# Patient Record
Sex: FEMALE | Race: BLACK OR AFRICAN AMERICAN
[De-identification: names, ages, dates, MRNs, and addresses within clinical notes are randomized per-mention and may not be internally consistent; named-entity substitution may affect disease eponyms.]

---

## 2018-01-12 ENCOUNTER — HOSPITAL ENCOUNTER (EMERGENCY)
Dept: HOSPITAL 62 - ER | Age: 58
Discharge: HOME | End: 2018-01-12
Payer: COMMERCIAL

## 2018-01-12 VITALS — SYSTOLIC BLOOD PRESSURE: 142 MMHG | DIASTOLIC BLOOD PRESSURE: 86 MMHG

## 2018-01-12 DIAGNOSIS — R42: Primary | ICD-10-CM

## 2018-01-12 DIAGNOSIS — Z79.899: ICD-10-CM

## 2018-01-12 DIAGNOSIS — I10: ICD-10-CM

## 2018-01-12 LAB
ADD MANUAL DIFF: NO
ALBUMIN SERPL-MCNC: 4.9 G/DL (ref 3.5–5)
ALP SERPL-CCNC: 115 U/L (ref 38–126)
ALT SERPL-CCNC: 39 U/L (ref 9–52)
ANION GAP SERPL CALC-SCNC: 12 MMOL/L (ref 5–19)
APPEARANCE UR: CLEAR
APTT PPP: (no result) S
AST SERPL-CCNC: 38 U/L (ref 14–36)
BASOPHILS # BLD AUTO: 0.1 10^3/UL (ref 0–0.2)
BASOPHILS NFR BLD AUTO: 1 % (ref 0–2)
BILIRUB DIRECT SERPL-MCNC: 0.2 MG/DL (ref 0–0.4)
BILIRUB SERPL-MCNC: 0.7 MG/DL (ref 0.2–1.3)
BILIRUB UR QL STRIP: NEGATIVE
BUN SERPL-MCNC: 15 MG/DL (ref 7–20)
CALCIUM: 10.3 MG/DL (ref 8.4–10.2)
CHLORIDE SERPL-SCNC: 103 MMOL/L (ref 98–107)
CO2 SERPL-SCNC: 29 MMOL/L (ref 22–30)
EOSINOPHIL # BLD AUTO: 0.1 10^3/UL (ref 0–0.6)
EOSINOPHIL NFR BLD AUTO: 1.6 % (ref 0–6)
ERYTHROCYTE [DISTWIDTH] IN BLOOD BY AUTOMATED COUNT: 14.1 % (ref 11.5–14)
GLUCOSE SERPL-MCNC: 96 MG/DL (ref 75–110)
GLUCOSE UR STRIP-MCNC: NEGATIVE MG/DL
HCT VFR BLD CALC: 40.5 % (ref 36–47)
HGB BLD-MCNC: 13.6 G/DL (ref 12–15.5)
KETONES UR STRIP-MCNC: NEGATIVE MG/DL
LYMPHOCYTES # BLD AUTO: 3 10^3/UL (ref 0.5–4.7)
LYMPHOCYTES NFR BLD AUTO: 40.2 % (ref 13–45)
MAGNESIUM SERPL-MCNC: 2 MG/DL (ref 1.6–2.3)
MCH RBC QN AUTO: 29.8 PG (ref 27–33.4)
MCHC RBC AUTO-ENTMCNC: 33.5 G/DL (ref 32–36)
MCV RBC AUTO: 89 FL (ref 80–97)
MONOCYTES # BLD AUTO: 0.5 10^3/UL (ref 0.1–1.4)
MONOCYTES NFR BLD AUTO: 7 % (ref 3–13)
NEUTROPHILS # BLD AUTO: 3.7 10^3/UL (ref 1.7–8.2)
NEUTS SEG NFR BLD AUTO: 50.2 % (ref 42–78)
NITRITE UR QL STRIP: NEGATIVE
PH UR STRIP: 7 [PH] (ref 5–9)
PHOSPHATE SERPL-MCNC: 3.6 MG/DL (ref 2.5–4.5)
PLATELET # BLD: 284 10^3/UL (ref 150–450)
POTASSIUM SERPL-SCNC: 3.9 MMOL/L (ref 3.6–5)
PROT SERPL-MCNC: 8.4 G/DL (ref 6.3–8.2)
PROT UR STRIP-MCNC: NEGATIVE MG/DL
RBC # BLD AUTO: 4.55 10^6/UL (ref 3.72–5.28)
SODIUM SERPL-SCNC: 144.4 MMOL/L (ref 137–145)
SP GR UR STRIP: 1
TOTAL CELLS COUNTED % (AUTO): 100 %
UROBILINOGEN UR-MCNC: NEGATIVE MG/DL (ref ?–2)
WBC # BLD AUTO: 7.3 10^3/UL (ref 4–10.5)

## 2018-01-12 PROCEDURE — 87086 URINE CULTURE/COLONY COUNT: CPT

## 2018-01-12 PROCEDURE — 93010 ELECTROCARDIOGRAM REPORT: CPT

## 2018-01-12 PROCEDURE — 84484 ASSAY OF TROPONIN QUANT: CPT

## 2018-01-12 PROCEDURE — 80076 HEPATIC FUNCTION PANEL: CPT

## 2018-01-12 PROCEDURE — 99284 EMERGENCY DEPT VISIT MOD MDM: CPT

## 2018-01-12 PROCEDURE — 36415 COLL VENOUS BLD VENIPUNCTURE: CPT

## 2018-01-12 PROCEDURE — 83735 ASSAY OF MAGNESIUM: CPT

## 2018-01-12 PROCEDURE — 85025 COMPLETE CBC W/AUTO DIFF WBC: CPT

## 2018-01-12 PROCEDURE — 84100 ASSAY OF PHOSPHORUS: CPT

## 2018-01-12 PROCEDURE — 93005 ELECTROCARDIOGRAM TRACING: CPT

## 2018-01-12 PROCEDURE — 80048 BASIC METABOLIC PNL TOTAL CA: CPT

## 2018-01-12 PROCEDURE — 81001 URINALYSIS AUTO W/SCOPE: CPT

## 2018-01-12 NOTE — EKG REPORT
SEVERITY:- ABNORMAL ECG -

SINUS RHYTHM

PROBABLE LEFT ATRIAL ABNORMALITY

LVH WITH SECONDARY REPOLARIZATION ABNORMALITY

ANTERIOR Q WAVES, POSSIBLY DUE TO LVH

PROLONGED QT INTERVAL

:

Confirmed by: King Lew MD 12-Jan-2018 13:58:27

## 2018-01-12 NOTE — ER DOCUMENT REPORT
ED General





- General


Chief Complaint: Dizziness


Stated Complaint: DIZZINESS,WEAKNESS


Time Seen by Provider: 18 09:27


Notes: 





She states that she had a upper respiratory infection.  Was on some hydrocodone 

cough medication as well as a Z-Gary.  Does not have any more sore throat or head

/face issues but just feels wiped out.  Does not know if maybe she got 

dehydrated.  Also noticed that her blood pressure is high.  Denies any chest 

pain, shortness of breath, abdominal pain, excessive nausea, vomiting or 

diarrhea, no neck stiffness.  No other major issues at this time.


TRAVEL OUTSIDE OF THE [place] IN LAST 30 DAYS: No





- HPI


Onset/Duration: Gradual


Severity: Mild


Pain Level: 0


Associated symptoms: Weakness


Exacerbated by: Denies


Relieved by: Denies





- Related Data


Allergies/Adverse Reactions: 


 





No Known Allergies Allergy (Verified 18 08:57)


 








Home Medications: 


 Current Home Medications





Azilsartan Med/Chlorthalidone [Edarbyclor 40-12.5 mg Tablet] 40 mg PO DAILY  [History]


Azithromycin 250 mg PO DAILY 18 [History]











Past Medical History





- General


Information source: Patient





- Social History


Smoking Status: Never Smoker


Chew tobacco use (# tins/day): No


Frequency of alcohol use: None


Drug Abuse: None


Lives with: Spouse/Significant other


Family History: Reviewed & Not Pertinent


Patient has suicidal ideation: No


Patient has homicidal ideation: No





- Past Medical History


Cardiac Medical History: Reports: Hx Hypertension - on meds


   Denies: Hx Atrial Fibrillation, Hx Congestive Heart Failure, Hx Coronary 

Artery Disease, Hx Heart Attack, Hx Hypercholesterolemia, Hx Peripheral 

Vascular Disease, Hx Heart Murmur


Pulmonary Medical History: 


   Denies: Hx Asthma, Hx Bronchitis, Hx COPD, Hx Pneumonia


Neurological Medical History: Denies: Hx Cerebrovascular Accident, Hx Seizures


Renal/ Medical History: Denies: Hx Ovarian Cysts, Hx Peritoneal Dialysis, Hx 

Pelvic Inflammatory Disease


Malignancy Medical History: Denies: Hx Breast Cancer, Hx Cervical Cancer, Hx 

Leukemia, Hx Ovarian Cancer


Musculoskeltal Medical History: Denies Hx Arthritis


Infectious Medical History: Denies: Hx HIV


Past Surgical History: Reports: Hx Cholecystectomy, Hx Hysterectomy, Hx Tubal 

Ligation.  Denies: Hx Appendectomy, Hx Bowel Surgery, Hx  Section, Hx 

Coronary Artery Bypass Graft, Hx Gastric Bypass Surgery, Hx Herniorrhaphy, Hx 

Mastectomy, Hx Pacemaker, Hx Tonsillectomy





- Immunizations


Hx Diphtheria, Pertussis, Tetanus Vaccination: Yes


Hx Pneumococcal Vaccination: 01





Review of Systems





- Review of Systems


Constitutional: Malaise, Weakness.  denies: Chills, Diaphoresis, Fever


EENT: No symptoms reported, See HPI


Cardiovascular: No symptoms reported, See HPI


Respiratory: No symptoms reported


Gastrointestinal: No symptoms reported


Genitourinary: No symptoms reported


Female Genitourinary: No symptoms reported


Musculoskeletal: No symptoms reported


Skin: No symptoms reported


Hematologic/Lymphatic: No symptoms reported


Neurological/Psychological: No symptoms reported





Physical Exam





- Vital signs


Vitals: 


 











Temp Pulse Resp BP Pulse Ox


 


 98.1 F   80   16   159/96 H  98 


 


 18 09:12  18 09:12  18 09:12  18 09:12  18 09:12











Interpretation: Normal





- General


General appearance: Appears well, Alert





- HEENT


Head: Normocephalic, Atraumatic


Eyes: Normal


Pupils: PERRL





- Respiratory


Respiratory status: No respiratory distress


Chest status: Nontender


Breath sounds: Normal


Chest palpation: Normal





- Cardiovascular


Rhythm: Regular


Heart sounds: Normal auscultation


Murmur: No





- Abdominal


Inspection: Normal


Distension: No distension


Bowel sounds: Normal


Tenderness: Nontender


Organomegaly: No organomegaly





- Back


Back: Normal, Nontender





- Extremities


General upper extremity: Normal inspection, Nontender, Normal color, Normal ROM

, Normal temperature


General lower extremity: Normal inspection, Nontender, Normal color, Normal ROM

, Normal temperature, Normal weight bearing.  No: Juan Carlos's sign





- Neurological


Neuro grossly intact: Yes


Cognition: Normal


Orientation: AAOx4


Lizeth Coma Scale Eye Opening: Spontaneous


L[place]y Coma Scale Verbal: Oriented


Lizeth Coma Scale Motor: Obeys Commands


[place]y Coma Scale Total: 15


Speech: Normal


Motor strength normal: LUE, RUE, LLE, RLE


Sensory: Normal





- Psychological


Associated symptoms: Normal affect, Normal mood





- Skin


Skin Temperature: Warm


Skin Moisture: Dry


Skin Color: Normal





Course





- Re-evaluation


Re-evalutation: 





18 10:19


Well-appearing female in no acute distress.  Will get some basic labs, 

urinalysis, EKG and reassess


18 12:15





Laboratory











  18





  09:00 09:00 09:00


 


WBC  7.3  


 


RBC  4.55  


 


Hgb  13.6  


 


Hct  40.5  


 


MCV  89  


 


MCH  29.8  


 


MCHC  33.5  


 


RDW  14.1 H  


 


Plt Count  284  


 


Seg Neutrophils %  50.2  


 


Lymphocytes %  40.2  


 


Monocytes %  7.0  


 


Eosinophils %  1.6  


 


Basophils %  1.0  


 


Absolute Neutrophils  3.7  


 


Absolute Lymphocytes  3.0  


 


Absolute Monocytes  0.5  


 


Absolute Eosinophils  0.1  


 


Absolute Basophils  0.1  


 


Sodium   144.4 


 


Potassium   3.9 


 


Chloride   103 


 


Carbon Dioxide   29 


 


Anion Gap   12 


 


BUN   15 


 


Creatinine   0.67 


 


Est GFR ( Amer)   > 60 


 


Est GFR (Non-Af Amer)   > 60 


 


Glucose   96 


 


Calcium   10.3 H 


 


Phosphorus   3.6 


 


Magnesium   2.0 


 


Total Bilirubin    0.7


 


Direct Bilirubin    0.2


 


Neonat Total Bilirubin    Not Reportable


 


Neonat Direct Bilirubin    Not Reportable


 


Neonat Indirect Bili    Not Reportable


 


AST    38 H


 


ALT    39


 


Alkaline Phosphatase    115


 


Troponin I   


 


Total Protein    8.4 H


 


Albumin    4.9














  18





  09:00


 


WBC 


 


RBC 


 


Hgb 


 


Hct 


 


MCV 


 


MCH 


 


MCHC 


 


RDW 


 


Plt Count 


 


Seg Neutrophils % 


 


Lymphocytes % 


 


Monocytes % 


 


Eosinophils % 


 


Basophils % 


 


Absolute Neutrophils 


 


Absolute Lymphocytes 


 


Absolute Monocytes 


 


Absolute Eosinophils 


 


Absolute Basophils 


 


Sodium 


 


Potassium 


 


Chloride 


 


Carbon Dioxide 


 


Anion Gap 


 


BUN 


 


Creatinine 


 


Est GFR ( Amer) 


 


Est GFR (Non-Af Amer) 


 


Glucose 


 


Calcium 


 


Phosphorus 


 


Magnesium 


 


Total Bilirubin 


 


Direct Bilirubin 


 


Neonat Total Bilirubin 


 


Neonat Direct Bilirubin 


 


Neonat Indirect Bili 


 


AST 


 


ALT 


 


Alkaline Phosphatase 


 


Troponin I  < 0.012


 


Total Protein 


 


Albumin 











18 13:03


Patient feeling much better at this time.  Reports that she started some 

medication that made her feel bad.  Remember that she had taken by systolic in 

the past.  Her blood pressures been running a little bit high so she took some 

by systolic.  Then she began to feel worse.  Advised her to stop taking the 

Bystolic.  Follow-up with her regular doctor.  Long discussion had with patient 

with regards to dietary modification which may help her blood pressure control.

  Patient verbalized understanding these instructions.  Copy of my weight loss 

and diet manual was given to her.  Will DC at this time.





- Vital Signs


Vital signs: 


 











Temp Pulse Resp BP Pulse Ox


 


 98.1 F   80   18   159/96 H  98 


 


 18 09:12  18 09:12  18 09:58  18 09:12  18 09:12














- Laboratory


Result Diagrams: 


 18 09:00





 18 09:00


Laboratory results interpreted by me: 


 











  18





  09:00 09:00 09:00


 


RDW  14.1 H  


 


Calcium   10.3 H 


 


AST    38 H


 


Total Protein    8.4 H


 


Urine Blood   


 


Ur Leukocyte Esterase   














  18





  09:00


 


RDW 


 


Calcium 


 


AST 


 


Total Protein 


 


Urine Blood  SMALL H


 


Ur Leukocyte Esterase  SMALL H














- EKG Interpretation by Me


EKG shows normal: Sinus rhythm, Intervals, QRS Complexes, ST-T Waves


Axis/QRS: Left axis deviation





Discharge





- Discharge


Clinical Impression: 


Hypertension


Qualifiers:


 Hypertension type: unspecified Qualified Code(s): I10 - Essential (primary) 

hypertension





Disposition: HOME, SELF-CARE


Additional Instructions: 


Weakness





     We did not find a definite cause for your weakness. This may require 

further medical tests. Weakness can be caused by infection, physical exhaustion

, rapid weight loss, dehydration, or medicine side effects. Diseases of the 

muscles, heart, nerves, and blood vessels can make you weak. Sometimes the 

problem is simply depression or lack of exercise.


     You should get plenty of rest. Unless the doctor tells you otherwise, it's 

usually best to add short periods of regular mild exercise. Eat a nutritious 

diet with multiple small, low-sugar meals.


     If symptoms continue, additional medical evaluation will be necessary. Be 

sure to follow up as instructed.


     If you become very dizzy, nauseated, or feel like you're going to faint, 

lie down right away. Wait until the symptoms have passed before you get up 

again. Stand up slowly.


     Call the doctor or return if you develop chest pain, abdominal pain, 

severe headache, irregular heartbeat or very fast pulse, confusion, vision 

problems, fever, muscular pain, or any other new symptom.


Forms:  Elevated Blood Pressure


Referrals: 


FOZIA SOTO MD [Primary Care Provider] - Follow up as needed

## 2018-01-12 NOTE — ER DOCUMENT REPORT
ED Medical Screen (RME)





- General


Chief Complaint: Dizziness


Stated Complaint: DIZZINESS,WEAKNESS


Time Seen by Provider: 18 09:27


Notes: 





58-year-old female here with complaints of generalized weakness ongoing past 

few days.  She states her blood pressure has also been somewhat elevated and 

she has been having to take an extra pill.  She denies any chest pain shortness 

of breath nausea vomiting diarrhea.  She does have a history of electrolyte 

derangements, she reports and does not know if this might be what is going on 

today.





EXAM


Regular rate and rhythm


TRAVEL OUTSIDE OF THE [place] IN LAST 30 DAYS: No





- Related Data


Allergies/Adverse Reactions: 


 





No Known Allergies Allergy (Verified 18 08:57)


 











Past Medical History





- Past Medical History


Cardiac Medical History: Reports: Hx Hypertension - on meds


   Denies: Hx Atrial Fibrillation, Hx Congestive Heart Failure, Hx Coronary 

Artery Disease, Hx Heart Attack, Hx Hypercholesterolemia, Hx Peripheral 

Vascular Disease, Hx Heart Murmur


Pulmonary Medical History: 


   Denies: Hx Asthma, Hx Bronchitis, Hx COPD, Hx Pneumonia


Neurological Medical History: Denies: Hx Cerebrovascular Accident, Hx Seizures


Renal/ Medical History: Denies: Hx Ovarian Cysts, Hx Pelvic Inflammatory 

Disease


Malignancy Medical History: Denies: Hx Breast Cancer, Hx Cervical Cancer, Hx 

Leukemia, Hx Ovarian Cancer


Musculoskeltal Medical History: Denies Hx Arthritis


Infectious Medical History: Denies: Hx HIV


Past Surgical History: Reports: Hx Cholecystectomy, Hx Hysterectomy, Hx Tubal 

Ligation.  Denies: Hx Appendectomy, Hx Bowel Surgery, Hx  Section, Hx 

Coronary Artery Bypass Graft, Hx Gastric Bypass Surgery, Hx Herniorrhaphy, Hx 

Mastectomy, Hx Pacemaker, Hx Tonsillectomy





- Immunizations


Hx Diphtheria, Pertussis, Tetanus Vaccination: Yes





Physical Exam





- Vital signs


Vitals: 





 











Temp Pulse Resp BP Pulse Ox


 


 98.1 F   80   16   159/96 H  98 


 


 18 09:12  18 09:12  18 09:12  18 09:12  18 09:12














Course





- Vital Signs


Vital signs: 





 











Temp Pulse Resp BP Pulse Ox


 


 98.1 F   80   16   159/96 H  98 


 


 18 09:12  18 09:12  18 09:12  18 09:12  18 09:12

## 2018-07-04 ENCOUNTER — HOSPITAL ENCOUNTER (EMERGENCY)
Dept: HOSPITAL 62 - ER | Age: 58
Discharge: HOME | End: 2018-07-04
Payer: COMMERCIAL

## 2018-07-04 VITALS — DIASTOLIC BLOOD PRESSURE: 86 MMHG | SYSTOLIC BLOOD PRESSURE: 155 MMHG

## 2018-07-04 DIAGNOSIS — R53.1: ICD-10-CM

## 2018-07-04 DIAGNOSIS — E87.6: Primary | ICD-10-CM

## 2018-07-04 DIAGNOSIS — R10.816: ICD-10-CM

## 2018-07-04 DIAGNOSIS — R19.7: ICD-10-CM

## 2018-07-04 DIAGNOSIS — R42: ICD-10-CM

## 2018-07-04 DIAGNOSIS — I10: ICD-10-CM

## 2018-07-04 LAB
ADD MANUAL DIFF: NO
ALBUMIN SERPL-MCNC: 4.4 G/DL (ref 3.5–5)
ALP SERPL-CCNC: 92 U/L (ref 38–126)
ALT SERPL-CCNC: 38 U/L (ref 9–52)
ANION GAP SERPL CALC-SCNC: 15 MMOL/L (ref 5–19)
APPEARANCE UR: (no result)
APTT PPP: YELLOW S
AST SERPL-CCNC: 32 U/L (ref 14–36)
BASOPHILS # BLD AUTO: 0.2 10^3/UL (ref 0–0.2)
BASOPHILS NFR BLD AUTO: 1.4 % (ref 0–2)
BILIRUB DIRECT SERPL-MCNC: 0.4 MG/DL (ref 0–0.4)
BILIRUB SERPL-MCNC: 0.5 MG/DL (ref 0.2–1.3)
BILIRUB UR QL STRIP: NEGATIVE
BUN SERPL-MCNC: 13 MG/DL (ref 7–20)
CALCIUM: 9.6 MG/DL (ref 8.4–10.2)
CHLORIDE SERPL-SCNC: 104 MMOL/L (ref 98–107)
CO2 SERPL-SCNC: 26 MMOL/L (ref 22–30)
EOSINOPHIL # BLD AUTO: 0.3 10^3/UL (ref 0–0.6)
EOSINOPHIL NFR BLD AUTO: 2.4 % (ref 0–6)
ERYTHROCYTE [DISTWIDTH] IN BLOOD BY AUTOMATED COUNT: 14.3 % (ref 11.5–14)
GLUCOSE SERPL-MCNC: 96 MG/DL (ref 75–110)
GLUCOSE UR STRIP-MCNC: NEGATIVE MG/DL
HCT VFR BLD CALC: 38.3 % (ref 36–47)
HGB BLD-MCNC: 12.9 G/DL (ref 12–15.5)
KETONES UR STRIP-MCNC: NEGATIVE MG/DL
LYMPHOCYTES # BLD AUTO: 5.7 10^3/UL (ref 0.5–4.7)
LYMPHOCYTES NFR BLD AUTO: 42.4 % (ref 13–45)
MCH RBC QN AUTO: 30 PG (ref 27–33.4)
MCHC RBC AUTO-ENTMCNC: 33.6 G/DL (ref 32–36)
MCV RBC AUTO: 89 FL (ref 80–97)
MONOCYTES # BLD AUTO: 1.2 10^3/UL (ref 0.1–1.4)
MONOCYTES NFR BLD AUTO: 8.6 % (ref 3–13)
NEUTROPHILS # BLD AUTO: 6.1 10^3/UL (ref 1.7–8.2)
NEUTS SEG NFR BLD AUTO: 45.2 % (ref 42–78)
NITRITE UR QL STRIP: NEGATIVE
PH UR STRIP: 5 [PH] (ref 5–9)
PLATELET # BLD: 257 10^3/UL (ref 150–450)
POTASSIUM SERPL-SCNC: 3.3 MMOL/L (ref 3.6–5)
PROT SERPL-MCNC: 7.8 G/DL (ref 6.3–8.2)
PROT UR STRIP-MCNC: NEGATIVE MG/DL
RBC # BLD AUTO: 4.28 10^6/UL (ref 3.72–5.28)
SODIUM SERPL-SCNC: 145.3 MMOL/L (ref 137–145)
SP GR UR STRIP: 1.01
TOTAL CELLS COUNTED % (AUTO): 100 %
UROBILINOGEN UR-MCNC: NEGATIVE MG/DL (ref ?–2)
WBC # BLD AUTO: 13.5 10^3/UL (ref 4–10.5)

## 2018-07-04 PROCEDURE — 93010 ELECTROCARDIOGRAM REPORT: CPT

## 2018-07-04 PROCEDURE — 85025 COMPLETE CBC W/AUTO DIFF WBC: CPT

## 2018-07-04 PROCEDURE — 36415 COLL VENOUS BLD VENIPUNCTURE: CPT

## 2018-07-04 PROCEDURE — 99285 EMERGENCY DEPT VISIT HI MDM: CPT

## 2018-07-04 PROCEDURE — 81001 URINALYSIS AUTO W/SCOPE: CPT

## 2018-07-04 PROCEDURE — 83735 ASSAY OF MAGNESIUM: CPT

## 2018-07-04 PROCEDURE — 93005 ELECTROCARDIOGRAM TRACING: CPT

## 2018-07-04 PROCEDURE — 80053 COMPREHEN METABOLIC PANEL: CPT

## 2018-07-04 NOTE — ER DOCUMENT REPORT
ED Dizziness/Weakness





- General


Chief Complaint: Dizziness


Stated Complaint: LIGHTHEADED, WEAKNESS


Time Seen by Provider: 18 15:21


Mode of Arrival: Ambulatory


Information source: Patient


TRAVEL OUTSIDE OF THE [place] IN LAST 30 DAYS: No





- HPI


Patient complains to provider of: Weakness


Onset: Last week


Onset/Duration: Gradual


Quality of pain: No pain


Severity: Mild


Context: denies: Chronic dizziness, Trauma, Vertigo, Other


Associated symptoms: Diarrhea - NOT PROFUSE BUT LOOSE, SEVERAL TIMES DAILY, 

Lightheaded.  denies: Chest pain, Confused, Headache, Palpitations, Recent fall

, Short of breath, Vertigo, Vomiting


Exacerbated by: Change in position - STANDING UP


Baseline gait: Walks w/o assistance





- Related Data


Allergies/Adverse Reactions: 


 





No Known Allergies Allergy (Verified 18 15:04)


 











Past Medical History





- General


Information source: Patient





- Social History


Smoking Status: Never Smoker


Chew tobacco use (# tins/day): No


Frequency of alcohol use: None


Drug Abuse: None


Family History: Reviewed & Not Pertinent


Patient has suicidal ideation: No


Patient has homicidal ideation: No





- Past Medical History


Cardiac Medical History: Reports: Hx Hypertension - on meds


   Denies: Hx Atrial Fibrillation, Hx Congestive Heart Failure, Hx Coronary 

Artery Disease, Hx Heart Attack, Hx Hypercholesterolemia, Hx Peripheral 

Vascular Disease, Hx Heart Murmur


Pulmonary Medical History: 


   Denies: Hx Asthma, Hx Bronchitis, Hx COPD, Hx Pneumonia


Neurological Medical History: Denies: Hx Cerebrovascular Accident, Hx Seizures


Renal/ Medical History: Denies: Hx Ovarian Cysts, Hx Peritoneal Dialysis, Hx 

Pelvic Inflammatory Disease


Malignancy Medical History: Denies: Hx Breast Cancer, Hx Cervical Cancer, Hx 

Leukemia, Hx Ovarian Cancer


Musculoskeltal Medical History: Denies Hx Arthritis


Psychiatric Medical History: Reports: None


Infectious Medical History: Denies: Hx HIV


Past Surgical History: Reports: Hx Cholecystectomy, Hx Hysterectomy, Hx Tubal 

Ligation.  Denies: Hx Appendectomy, Hx Bowel Surgery, Hx  Section, Hx 

Coronary Artery Bypass Graft, Hx Gastric Bypass Surgery, Hx Herniorrhaphy, Hx 

Mastectomy, Hx Pacemaker, Hx Tonsillectomy





- Immunizations


Hx Diphtheria, Pertussis, Tetanus Vaccination: Yes


Hx Pneumococcal Vaccination: 01





Review of Systems





- Review of Systems


Constitutional: Weakness.  denies: Chills, Fever


EENT: No symptoms reported


Cardiovascular: Lightheaded.  denies: Chest pain, Palpitations, Heart racing, 

Orthopnea, Dyspnea, Syncope


Respiratory: No symptoms reported


Gastrointestinal: See HPI


Genitourinary: No symptoms reported


Female Genitourinary: Post menopausal


Musculoskeletal: No symptoms reported


Skin: No symptoms reported


Neurological/Psychological: No symptoms reported





Physical Exam





- Vital signs


Vitals: 


 











Temp Pulse Resp BP Pulse Ox


 


 97.8 F   83   20   172/97 H  100 


 


 18 15:09  18 15:09  18 15:09  18 15:09  18 15:09











Interpretation: Hypertensive.  No: Tachycardic, Tachypneic





- General


General appearance: Appears well


In distress: None





- HEENT


Head: Normocephalic


Eyes: Normal


Conjunctiva: Normal


Ears: Normal


Nasal: Normal


Mouth/Lips: Normal


Mucous membranes: Normal





- Respiratory


Respiratory status: No respiratory distress


Breath sounds: Normal





- Cardiovascular


Rhythm: Regular


Heart sounds: Normal auscultation


Murmur: No





- Abdominal


Inspection: Normal


Distension: No distension


Bowel sounds: Hypoactive


Tenderness: Tender - SLIGHT, EPIGASTRIC





- Back


Back: Normal





- Extremities


General upper extremity: Normal inspection


General lower extremity: Normal inspection





- Neurological


Neuro grossly intact: Yes


Cognition: Normal


Orientation: AAOx4





- Psychological


Associated symptoms: Normal affect, Normal mood





- Skin


Skin Temperature: Warm


Skin Moisture: Dry


Skin Color: Normal


Skin Turgor: Elastic





Course





- Vital Signs


Vital signs: 


 











Temp Pulse Resp BP Pulse Ox


 


 97.8 F   63   18   158/83 H  95 


 


 18 18:39  18 18:39  18 18:39  18 18:39  18 18:39














- Laboratory


Result Diagrams: 


 18 15:37





 18 15:37


Laboratory results interpreted by me: 


 











  18





  15:37 15:37 18:30


 


WBC  13.5 H  


 


RDW  14.3 H  


 


Absolute Lymphocytes  5.7 H  


 


Sodium   145.3 H 


 


Potassium   3.3 L 


 


Urine Blood    SMALL H


 


Ur Leukocyte Esterase    TRACE H














- EKG Interpretation by Me


EKG shows normal: Sinus rhythm, Axis, Intervals, QRS Complexes, ST-T Waves


Rate: Normal


Rhythm: NSR





Discharge





- Discharge


Clinical Impression: 


 Hypokalemia, gastrointestinal losses





Condition: Stable


Disposition: HOME, SELF-CARE


Instructions:  Hypokalemia ([facility])


Additional Instructions: 


TAKE KCL (POTASSIUM) AS DIRECTED, BEGINNING TOMORROW (THURSDAY).


CONTINUE YOUR OTHER MEDICATIONS AS USUAL.


DRINK PLENTY OF FLUIDS.


FOLLOW UP WITH DR. SOTO TOMORROW OR FRIDAY, CALL FOR APPOINTMENT.


RETURN TO E.R. IF PROBLEMS, ANY TIME.


Prescriptions: 


Potassium Gluconate [Potassium] 600 mg PO BID #20 tablet


Referrals: 


FOZIA SOTO MD [Primary Care Provider] - Follow up in 3-5 days

## 2018-07-04 NOTE — ER DOCUMENT REPORT
ED Medical Screen (RME)





- General


Chief Complaint: Dizziness


Stated Complaint: LIGHTHEADED, WEAKNESS


Mode of Arrival: Ambulatory


Information source: Patient


Notes: 





58 y.o female with HTN presents to the ED with dizziness, weakness and fatigue 

for the past couple of days but worsening today. Pt reports that within the 

last couple of days she would have felt better when she drank more fluids but 

that today she does not feel better with increased fluid intake. She denies any 

change with movement. Pt denies any vision changes or CP or abd pain. Pt 

reports that she takes medication for her HTN and denies any recent changes to 

her BP medication. She denies taking any other medications at home. 








I have greeted and performed a rapid initial assessment of the patient.  A 

comprehensive ED assessment and evaluation of the patient, analysis of test 

results, and completion of the medical decision making process will be 

conducted by additional ED providers.








PHYSICAL EXAM:


 


General: Alert, appears well. 


 


HEENT: Normocephalic. Atraumatic. Dry mucous membranes.


 


Neck: Supple. 


 


Cardiovascular: RRR





Respiratory: CTAB


 


Abdominal: No distension. 


 


Extremities: Moves all four extremities.


 


Neurological: Normal cognition. AAOx4. Normal speech.  


 


Psychological: Normal affect. Normal Mood. 


 


Skin: Warm. Dry. Normal color.


TRAVEL OUTSIDE OF THE [place] IN LAST 30 DAYS: No





- Related Data


Allergies/Adverse Reactions: 


 





No Known Allergies Allergy (Verified 18 15:04)


 











Past Medical History





- Social History


Chew tobacco use (# tins/day): No


Frequency of alcohol use: None


Drug Abuse: None





- Past Medical History


Cardiac Medical History: Reports: Hx Hypertension - on meds


   Denies: Hx Atrial Fibrillation, Hx Congestive Heart Failure, Hx Coronary 

Artery Disease, Hx Heart Attack, Hx Hypercholesterolemia, Hx Peripheral 

Vascular Disease, Hx Heart Murmur


Pulmonary Medical History: 


   Denies: Hx Asthma, Hx Bronchitis, Hx COPD, Hx Pneumonia


Neurological Medical History: Denies: Hx Cerebrovascular Accident, Hx Seizures


Renal/ Medical History: Denies: Hx Ovarian Cysts, Hx Peritoneal Dialysis, Hx 

Pelvic Inflammatory Disease


Malignancy Medical History: Denies: Hx Breast Cancer, Hx Cervical Cancer, Hx 

Leukemia, Hx Ovarian Cancer


Musculoskeltal Medical History: Denies Hx Arthritis


Infectious Medical History: Denies: Hx HIV


Past Surgical History: Reports: Hx Cholecystectomy, Hx Hysterectomy, Hx Tubal 

Ligation.  Denies: Hx Appendectomy, Hx Bowel Surgery, Hx  Section, Hx 

Coronary Artery Bypass Graft, Hx Gastric Bypass Surgery, Hx Herniorrhaphy, Hx 

Mastectomy, Hx Pacemaker, Hx Tonsillectomy





- Immunizations


Hx Diphtheria, Pertussis, Tetanus Vaccination: Yes





Physical Exam





- Vital signs


Vitals: 


 











Temp Pulse Resp BP Pulse Ox


 


 97.8 F   83   20   172/97 H  100 


 


 18 15:09  18 15:09  18 15:09  18 15:09  18 15:09














Course





- Vital Signs


Vital signs: 


 











Temp Pulse Resp BP Pulse Ox


 


 97.8 F   83   20   172/97 H  100 


 


 18 15:09  18 15:09  18 15:09  18 15:09  18 15:09














- Laboratory


Result Diagrams: 


 18 15:37





 18 15:37





Doctor's Discharge





- Discharge


Referrals: 


FOZIA SOTO MD [Primary Care Provider] - Follow up as needed





Scribe Documentation





- Scribe


Written by Serene:: Serene Cobb 18 [id number]


acting as scribe for :: Thong

## 2018-12-03 ENCOUNTER — HOSPITAL ENCOUNTER (OUTPATIENT)
Dept: HOSPITAL 62 - OD | Age: 58
End: 2018-12-03
Attending: CLINIC/CENTER
Payer: COMMERCIAL

## 2018-12-03 DIAGNOSIS — I10: ICD-10-CM

## 2018-12-03 DIAGNOSIS — Z79.899: ICD-10-CM

## 2018-12-03 DIAGNOSIS — R73.03: Primary | ICD-10-CM

## 2018-12-03 LAB
ERYTHROCYTE [DISTWIDTH] IN BLOOD BY AUTOMATED COUNT: 14.6 % (ref 11.5–14)
HCT VFR BLD CALC: 41 % (ref 36–47)
HGB BLD-MCNC: 14 G/DL (ref 12–15.5)
MCH RBC QN AUTO: 30.1 PG (ref 27–33.4)
MCHC RBC AUTO-ENTMCNC: 34.3 G/DL (ref 32–36)
MCV RBC AUTO: 88 FL (ref 80–97)
PLATELET # BLD: 262 10^3/UL (ref 150–450)
RBC # BLD AUTO: 4.66 10^6/UL (ref 3.72–5.28)
WBC # BLD AUTO: 5.3 10^3/UL (ref 4–10.5)

## 2018-12-03 PROCEDURE — 82306 VITAMIN D 25 HYDROXY: CPT

## 2018-12-03 PROCEDURE — 85027 COMPLETE CBC AUTOMATED: CPT

## 2018-12-03 PROCEDURE — 84443 ASSAY THYROID STIM HORMONE: CPT

## 2018-12-03 PROCEDURE — 36415 COLL VENOUS BLD VENIPUNCTURE: CPT

## 2020-06-19 ENCOUNTER — HOSPITAL ENCOUNTER (EMERGENCY)
Dept: HOSPITAL 62 - ER | Age: 60
Discharge: HOME | End: 2020-06-19
Payer: COMMERCIAL

## 2020-06-19 VITALS — DIASTOLIC BLOOD PRESSURE: 86 MMHG | SYSTOLIC BLOOD PRESSURE: 143 MMHG

## 2020-06-19 DIAGNOSIS — R53.1: Primary | ICD-10-CM

## 2020-06-19 DIAGNOSIS — M79.10: ICD-10-CM

## 2020-06-19 DIAGNOSIS — I10: ICD-10-CM

## 2020-06-19 LAB
ADD MANUAL DIFF: NO
ALBUMIN SERPL-MCNC: 4.6 G/DL (ref 3.5–5)
ALP SERPL-CCNC: 104 U/L (ref 38–126)
ANION GAP SERPL CALC-SCNC: 9 MMOL/L (ref 5–19)
APPEARANCE UR: CLEAR
APTT PPP: (no result) S
AST SERPL-CCNC: 42 U/L (ref 14–36)
BASOPHILS # BLD AUTO: 0.1 10^3/UL (ref 0–0.2)
BASOPHILS NFR BLD AUTO: 1.1 % (ref 0–2)
BILIRUB DIRECT SERPL-MCNC: 0 MG/DL (ref 0–0.4)
BILIRUB SERPL-MCNC: 0.6 MG/DL (ref 0.2–1.3)
BILIRUB UR QL STRIP: NEGATIVE
BUN SERPL-MCNC: 14 MG/DL (ref 7–20)
CALCIUM: 10.1 MG/DL (ref 8.4–10.2)
CHLORIDE SERPL-SCNC: 106 MMOL/L (ref 98–107)
CO2 SERPL-SCNC: 28 MMOL/L (ref 22–30)
EOSINOPHIL # BLD AUTO: 0.1 10^3/UL (ref 0–0.6)
EOSINOPHIL NFR BLD AUTO: 1.5 % (ref 0–6)
ERYTHROCYTE [DISTWIDTH] IN BLOOD BY AUTOMATED COUNT: 14 % (ref 11.5–14)
GLUCOSE SERPL-MCNC: 98 MG/DL (ref 75–110)
GLUCOSE UR STRIP-MCNC: NEGATIVE MG/DL
HCT VFR BLD CALC: 38.5 % (ref 36–47)
HGB BLD-MCNC: 13 G/DL (ref 12–15.5)
KETONES UR STRIP-MCNC: NEGATIVE MG/DL
LYMPHOCYTES # BLD AUTO: 3 10^3/UL (ref 0.5–4.7)
LYMPHOCYTES NFR BLD AUTO: 33.1 % (ref 13–45)
MCH RBC QN AUTO: 30.2 PG (ref 27–33.4)
MCHC RBC AUTO-ENTMCNC: 33.8 G/DL (ref 32–36)
MCV RBC AUTO: 90 FL (ref 80–97)
MONOCYTES # BLD AUTO: 0.6 10^3/UL (ref 0.1–1.4)
MONOCYTES NFR BLD AUTO: 6.6 % (ref 3–13)
NEUTROPHILS # BLD AUTO: 5.3 10^3/UL (ref 1.7–8.2)
NEUTS SEG NFR BLD AUTO: 57.7 % (ref 42–78)
NITRITE UR QL STRIP: NEGATIVE
PH UR STRIP: 7 [PH] (ref 5–9)
PLATELET # BLD: 271 10^3/UL (ref 150–450)
POTASSIUM SERPL-SCNC: 3.9 MMOL/L (ref 3.6–5)
PROT SERPL-MCNC: 8 G/DL (ref 6.3–8.2)
PROT UR STRIP-MCNC: NEGATIVE MG/DL
RBC # BLD AUTO: 4.3 10^6/UL (ref 3.72–5.28)
SP GR UR STRIP: 1
TOTAL CELLS COUNTED % (AUTO): 100 %
UROBILINOGEN UR-MCNC: NEGATIVE MG/DL (ref ?–2)
WBC # BLD AUTO: 9.2 10^3/UL (ref 4–10.5)

## 2020-06-19 PROCEDURE — 80053 COMPREHEN METABOLIC PANEL: CPT

## 2020-06-19 PROCEDURE — 36415 COLL VENOUS BLD VENIPUNCTURE: CPT

## 2020-06-19 PROCEDURE — 93005 ELECTROCARDIOGRAM TRACING: CPT

## 2020-06-19 PROCEDURE — 99285 EMERGENCY DEPT VISIT HI MDM: CPT

## 2020-06-19 PROCEDURE — 85025 COMPLETE CBC W/AUTO DIFF WBC: CPT

## 2020-06-19 PROCEDURE — 71046 X-RAY EXAM CHEST 2 VIEWS: CPT

## 2020-06-19 PROCEDURE — 93010 ELECTROCARDIOGRAM REPORT: CPT

## 2020-06-19 PROCEDURE — 83735 ASSAY OF MAGNESIUM: CPT

## 2020-06-19 PROCEDURE — 81001 URINALYSIS AUTO W/SCOPE: CPT

## 2020-06-19 PROCEDURE — 96360 HYDRATION IV INFUSION INIT: CPT

## 2020-06-19 PROCEDURE — 84484 ASSAY OF TROPONIN QUANT: CPT

## 2020-06-19 NOTE — ER DOCUMENT REPORT
Entered by LINDA GILMORE SCRIBE  06/19/20 1149 





Acting as scribe for:SANTIAGO KINGSTON MD





ED General





- General


Chief Complaint: Weakness


Stated Complaint: WEAKNESS


Time Seen by Provider: 06/19/20 10:50


Primary Care Provider: 


ARPITA SOTO NP [Primary Care Provider] - Follow up as needed


Information source: Patient


Notes: 





This 60 year old female patient presents to the emergency department today with 

complaints of mild body aches. Patient states she went for a walk this morning 

and had body aches after. Patient states she thinks she was dehydrated, forgot 

her HTN medication before the walk, and remembered to take her medication after 

the walk. Patient states she walks about x4-5 miles everyday with her friend and

it takes around x1 hour. Denies any chest pain, shortness of breath, or 

nausea/vomiting. Patient also reports vision issues since a lens replacement 

surgery she had x3 months ago for cataracts. 


TRAVEL OUTSIDE OF THE [place] IN LAST 30 DAYS: No





- Related Data


Allergies/Adverse Reactions: 


                                        





No Known Allergies Allergy (Verified 06/19/20 10:50)


   











Past Medical History





- General


Information source: Patient





- Social History


Smoking Status: Never Smoker


Cigarette use (# per day): No


Chew tobacco use (# tins/day): No


Frequency of alcohol use: None


Drug Abuse: None


Family History: Reviewed & Not Pertinent


Patient has homicidal ideation: No





- Past Medical History


Cardiac Medical History: Reports: Hx Hypertension - on meds


Past Surgical History: Reports: Hx Cholecystectomy, Hx Hysterectomy, Hx Tubal 

Ligation, Other - Lens replacement surgery





- Immunizations


Hx Diphtheria, Pertussis, Tetanus Vaccination: Yes


Hx Pneumococcal Vaccination: 01/01/01





Review of Systems





- Review of Systems


Constitutional: See HPI


EENT: See HPI, Other - vision issues


Cardiovascular: See HPI.  denies: Chest pain


Respiratory: See HPI.  denies: Short of breath


Gastrointestinal: See HPI.  denies: Nausea, Vomiting


Genitourinary: No symptoms reported


Female Genitourinary: No symptoms reported


Musculoskeletal: No symptoms reported


Skin: No symptoms reported


Hematologic/Lymphatic: No symptoms reported


Neurological/Psychological: No symptoms reported


-: Yes All other systems reviewed and negative





Physical Exam





- Vital signs


Vitals: 


                                        











Temp Pulse Resp BP Pulse Ox


 


 98.5 F   73   16   166/96 H  99 


 


 06/19/20 10:48  06/19/20 10:48  06/19/20 10:48  06/19/20 10:48  06/19/20 10:48














- General


General appearance: Appears well, Alert





- HEENT


Head: Normocephalic, Atraumatic


Eyes: Normal


Pupils: PERRL


Ears: Normal


External canal: Normal


Tympanic membrane: Normal


Pharynx: Normal





- Respiratory


Respiratory status: No respiratory distress


Chest status: Nontender


Breath sounds: Normal


Chest palpation: Normal





- Cardiovascular


Rhythm: Regular


Heart sounds: Normal auscultation


Murmur: No





- Abdominal


Inspection: Normal, Obese


Distension: No distension


Bowel sounds: Normal


Tenderness: Nontender





- Extremities


General upper extremity: Normal inspection.  No: Edema


General lower extremity: Normal inspection.  No: Edema





- Neurological


Neuro grossly intact: Yes


Cognition: Normal


Orientation: AAOx4


Speech: Normal





- Psychological


Associated symptoms: Normal affect, Normal mood





- Skin


Skin Temperature: Warm


Skin Moisture: Dry


Skin Color: Normal





Course





- Re-evaluation


Re-evalutation: 





06/19/20 14:42


Patient resting comfortably not showing any signs of distress.





- Vital Signs


Vital signs: 


                                        











Temp Pulse Resp BP Pulse Ox


 


 98.5 F   64   14   145/99 H  100 


 


 06/19/20 10:48  06/19/20 13:22  06/19/20 13:25  06/19/20 13:25  06/19/20 13:25














- Laboratory


Result Diagrams: 


                                 06/19/20 11:06





                                 06/19/20 11:06


Laboratory results interpreted by me: 


                                        











  06/19/20





  11:06


 


AST  42 H


 


ALT  36 H











06/19/20 14:42


Laboratory evaluation is essentially negative.  Patient's troponin x2 is within 

normal limits.





- Diagnostic Test


Radiology reviewed: Image reviewed, Reports reviewed


Radiology results interpreted by me: 





06/19/20 14:41


CT scan of head shows no stroke no acute process.





Chest x-ray shows no acute process no infiltrate no cardiomegaly.





- EKG Interpretation by Me


Additional EKG results interpreted by me: 





06/19/20 14:41


Twelve-lead EKG shows normal sinus rhythm rate of 76 left atrial abnormality LVH

noted anterior Q waves thought to be related to LVH.





Discharge





- Discharge


Clinical Impression: 


 Generalized weakness, Hypertension





Condition: Stable


Disposition: HOME, SELF-CARE


Referrals: 


ARPITA SOTO NP [Primary Care Provider] - Follow up as needed





I personally performed the services described in the documentation, reviewed and

edited the documentation which was dictated to the scribe in my presence, and it

accurately records my words and actions.

## 2020-06-19 NOTE — ER DOCUMENT REPORT
ED NIH Stroke Scale





- NIH Stroke Scale


*: 1. NIH scale should be completed with appropriate accompanying assessment 

tools.


*: 2. The NIH should reflect what the patient is capable of doing and should not

be coached by the clinician.


1a. Level of Consciousness: 0=Alert;keenly responsive


-: 1=Drowsy


-: 2=Obtunded


-: 3=Coma/unresponsive or reflex to noxious stimuli.


1a. Responses: 0


1b. Orientation Questions: a. What month is it?


-: b. How old are you?


-: 0=Answers both questions correctly.


-: 1=Answers one question correctly or patient is intubated or has orotracheal 

trauma.


-: 2=Answers neither question correctly.


1c. Response to commands: a. Open and close eyes?


-: b.  and release hand?


-: Credit is given despite weakness. Demonstration of task is permitted. 

Substitute command if hands cannot be used.


-: 0=Performs both tasks correctly


-: 1=Performs one task correctly


-: 2=Performs neither task correctly


1c. Responses: 0


2. Gaze: Establish eye contact and instruct patient to "Follow my finger"


-: 0=Normal


-: 1=Partial gaze palsy. Gaze is abnormal in one or both eyes, but where forced 

deviation or total gaze paresis is not present.


-: 2=Forced deviation or total gaze paresis.


2. Responses: 0


3. Visual Fields: Sees fingers in all four quadrants.


-: 0=No visual loss.


-: 1=Partial hemianopsia.


-: 2=Complete hemianopsia.


-: 3=Bilateral hemianopsia (including Cortical blindness)


3. Responses: 0


4. Facial Movement: Instruct patient to:


-: a. Show me your teeth


-: b. Raise your eyebrows


-: c. Close your eyes


-: d. Smile


-: 0=Normal symmetrical movement


-: 1=Minor paralysis (flattened nasolabial fold, asymmetry on smiling).


-: 2=Partial paralysis (total or near total paralysis of lower face).


-: 3=Complete paralysis of upper and lower face


5. Motor functions (left arm): Alternate sides and extend each arm with palms 

down (90 degrees if sitting or 45 degrees for supine).


-: 0=No drift;limb holds for full 10 seconds.


-: 1=Drift; limb holds but drifts down before full 10 seconds, but does not hit 

bed.


-: 2=Some effort against gravity; limb cannot get to or maintain position.


-: 3=No effort against gravity; limb falls.


-: 4=No movement.


-: UN=Amputation, joint fusion, explain in comments.


5. Motor Functions (right arm): Alternate sides and extend each arm with palms 

down (90 degrees if sitting or 45 degrees for supine).


-: 0=No drift;limb holds for full 10 seconds.


-: 1=Drift; limb holds but drifts down before full 10 seconds, but does not hit 

bed.


-: 2=Some effort against gravity; limb cannot get to or maintain position.


-: 3=No effort against gravity; limb falls.


-: 4=No movement.


-: UN=Amputation, joint fusion, explain in comments.


5. Responses (right arm): 0


6. Motor Functions (left leg): With patient lying supine, alternate sides and 

extend each leg (30 degrees always while supine).


-: 0=No drift, leg holds position for full 5 seconds


-: 1=Drift; leg falls before full 5 seconds but does not hit bed.


-: 2=Some effort against gravity, leg falls to bed but some effort against 

gravity.


-: 3=No effort against gravity, leg falls to bed immediately.


-: 4=No movement.


-: UN=Amputation, joint fusion; explain in comments.


6. Responses (left leg): 0


6. Motor Functions (right leg): With patient lying supine, alternate sides and 

extend each leg (30 degrees always while supine).


-: 0=No drift, leg holds position for full 5 seconds


-: 1=Drift; leg falls before full 5 seconds but does not hit bed.


-: 2=Some effort against gravity, leg falls to bed but some effort against 

gravity.


-: 3=No effort against gravity, leg falls to bed immediately.


-: 4=No movement.


-: UN=Amputation, joint fusion; explain in comments.


6. Responses (right leg): 0


7. Limb Ataxia: With eyes open instruct patient to:


-: a. "Touch your finger to your nose".


-: b. "Touch your heel to your shin"


-: 0=Absent


-: 1=Present in one limb.


-: 2=Present in two limbs.


-: UN=Amputation or joint fusion; explain in comments.


7. Responses: 0


8. Sensory: Test sensation using pinprick or noxious stimuli.  Test as many body

parts as possible.


-: 0=Normal;no sensory loss


-: 1=Mile to moderate sensory loss (patient feels pin prick but is less sharp on

affected side).


-: 2=Severe or total sensory loss.


8. Responses: 0


9. Best Language: Instruct patient to:


-: a. "Describe what you see in this picture."


-: b. "Name the items in this picture."


-: c. "Read these sentences."


-: 0=No aphasia, normal


-: 1=Mild to moderate aphasia.


-: 2=Severe aphasia


-: 3=Mute, global aphasia, no usable speech or auditory comprehension.


10. Articulation, Dysarthia: Instruct patient to:


-: "Read these words" or "Repeat these words"


-: 0=Normal


-: 1=Mild to moderate; patient may slur some words but can be understood without

difficulty.


-: 2=Severe; patients speech so slurred as to be unintelligible in the absence 

of dysphasia.


-: UN=Intubated or other physical barrier, explain in comments.


10. Responses: 0


11. Extinction or inattention: 0=No abnormality


-: 1= Visual, tactile, auditory, spatial, or personal inattention or extinction 

to bilateral simulation in one or the sensory modalities.


-: 2=Profound margret-inattention or margret-inattention to more than one modality; 

does not recognize own hand.


11. Responses: 0


Total Score: 0

## 2020-06-19 NOTE — EKG REPORT
SEVERITY:- ABNORMAL ECG -

SINUS RHYTHM

PROBABLE LEFT ATRIAL ABNORMALITY

LEFT VENTRICULAR HYPERTROPHY

ANTERIOR Q WAVES, POSSIBLY DUE TO LVH

:

Confirmed by: King Lew MD 19-Jun-2020 20:14:01

## 2020-06-19 NOTE — RADIOLOGY REPORT (SQ)
EXAM DESCRIPTION:  CHEST 2 VIEWS



IMAGES COMPLETED DATE/TIME:  6/19/2020 11:18 am



REASON FOR STUDY:  weakness



COMPARISON:  11/11/2014



EXAM PARAMETERS:  NUMBER OF VIEWS: two views

TECHNIQUE: Digital Frontal and Lateral radiographic views of the chest acquired.

RADIATION DOSE: NA

LIMITATIONS: none



FINDINGS:  LUNGS AND PLEURA: No opacities, masses or pneumothorax. No pleural effusion.

MEDIASTINUM AND HILAR STRUCTURES: No masses or contour abnormalities.

HEART AND VASCULAR STRUCTURES: Heart normal size.  No evidence for failure.

BONES: No acute findings.

HARDWARE: None in the chest.

OTHER: No other significant finding.



IMPRESSION:  NO ACUTE RADIOGRAPHIC FINDING IN THE CHEST.



TECHNICAL DOCUMENTATION:  JOB ID:  [id number]

 2011 [employer]- All Rights Reserved



Reading location - IP/workstation name: [email or web address]

## 2020-06-19 NOTE — ER DOCUMENT REPORT
ED Medical Screen (RME)





- General


Chief Complaint: Weakness


Stated Complaint: WEAKNESS


Time Seen by Provider: 20 10:50


Primary Care Provider: 


ARPITA SOTO NP [Primary Care Provider] - Follow up as needed


Notes: 





Patient is a 60-year-old female who presents to the emergency department with a 

chief complaint of generalized weakness.  Patient states that she went for her 

normal morning walk in when she got back she felt very fatigued.  States that 

this is really not like her.  





Exam: Alert and oriented.  No neurological deficits noted.





I have greeted and performed a rapid initial assessment of this patient.  A 

comprehensive ED assessment and evaluation of the patient, analysis of test 

results and completion of medical decision making process will be conducted by 

an additional ED providers.


TRAVEL OUTSIDE OF THE [place] IN LAST 30 DAYS: No





- Related Data


Allergies/Adverse Reactions: 


                                        





No Known Allergies Allergy (Verified 20 10:50)


   











Past Medical History





- Social History


Chew tobacco use (# tins/day): No


Frequency of alcohol use: None


Drug Abuse: None





- Past Medical History


Cardiac Medical History: Reports: Hx Hypertension - on meds


   Denies: Hx Atrial Fibrillation, Hx Congestive Heart Failure, Hx Coronary 

Artery Disease, Hx Heart Attack, Hx Hypercholesterolemia, Hx Peripheral Vascular

Disease, Hx Heart Murmur


Pulmonary Medical History: 


   Denies: Hx Asthma, Hx Bronchitis, Hx COPD, Hx Pneumonia


Neurological Medical History: Denies: Hx Cerebrovascular Accident, Hx Seizures


Renal/ Medical History: Denies: Hx Ovarian Cysts, Hx Peritoneal Dialysis, Hx 

Pelvic Inflammatory Disease


Malignancy Medical History: Denies: Hx Breast Cancer, Hx Cervical Cancer, Hx 

Leukemia, Hx Ovarian Cancer


Musculoskeltal Medical History: Denies Hx Arthritis


Infectious Medical History: Denies: Hx HIV


Past Surgical History: Reports: Hx Cholecystectomy, Hx Hysterectomy, Hx Tubal 

Ligation.  Denies: Hx Appendectomy, Hx Bowel Surgery, Hx  Section, Hx 

Coronary Artery Bypass Graft, Hx Gastric Bypass Surgery, Hx Herniorrhaphy, Hx 

Mastectomy, Hx Pacemaker, Hx Tonsillectomy





- Immunizations


Hx Diphtheria, Pertussis, Tetanus Vaccination: Yes





Physical Exam





- Vital signs


Vitals: 


                                        











Temp Pulse Resp BP Pulse Ox


 


 98.5 F   73   16   166/96 H  99 


 


 20 10:48  20 10:48  20 10:48  20 10:48  20 10:48














Course





- Vital Signs


Vital signs: 


                                        











Temp Pulse Resp BP Pulse Ox


 


 98.5 F   73   16   166/96 H  99 


 


 20 10:48  20 10:48  20 10:48  20 10:48  20 10:48














Doctor's Discharge





- Discharge


Referrals: 


ARPITA SOTO NP [Primary Care Provider] - Follow up as needed